# Patient Record
(demographics unavailable — no encounter records)

---

## 2024-12-12 NOTE — DEVELOPMENTAL MILESTONES
[Normal Development] : Normal Development [None] : none [Urinates in a potty or toilet] : does not urinate in a potty or toilet [Plays pretend with toys or dolls] : does not play pretend with toys or dolls [Pokes food with fork] : does not poke food with fork [Uses pronouns correctly] : does not use pronouns correctly [Explains the reason for things,] : does not explain the reason for things, such as needing a sweater when it's cold [Names at least one color] : does not name at least one color [Walks up steps, using one] : walks up steps, using one foot, then the other [Runs well without falling] : runs well without falling [Grasps crayon with thumb] : grasps crayon with thumb and fingers instead of fist [Catches a large ball] : does not catch a large ball [Copies a vertical line] : does not copy a vertical line [Not Passed] : not passed [FreeTextEntry1] : ASD diagnosis

## 2024-12-12 NOTE — PHYSICAL EXAM

## 2024-12-12 NOTE — DISCUSSION/SUMMARY
[Normal Growth] : growth [Normal Development] : development [None] : No known medical problems [No Elimination Concerns] : elimination [No Feeding Concerns] : feeding [No Skin Concerns] : skin [Normal Sleep Pattern] : sleep [Family Routines] : family routines [Language Promotion and Communication] : language promotion and communication [Social Development] : social development [ Considerations] :  considerations [Safety] : safety [No Medications] : ~He/She~ is not on any medications [Parent/Guardian] : parent/guardian [] : The components of the vaccine(s) to be administered today are listed in the plan of care. The disease(s) for which the vaccine(s) are intended to prevent and the risks have been discussed with the caretaker.  The risks are also included in the appropriate vaccination information statements which have been provided to the patient's caregiver.  The caregiver has given consent to vaccinate. [FreeTextEntry1] : Continue cow's milk. Continue table foods, 3 meals with 2-3 snacks per day. Incorporate flourinated water daily in a sippy cup. Brush teeth twice a day with soft toothbrush. Recommend visit to dentist. When in car, keep child in rear-facing car seats until age 2, or until  the maximum height and weight for seat is reached. Put toddler to sleep in own bed. Help toddler to maintain consistent daily routines and sleep schedule. Toilet training discussed. Ensure home is safe. Use consistent, positive discipline. Read aloud to toddler. Limit screen time to no more than 2 hours per day. ASD -continue all intervention  Flu vaccine today  needs dental visit -mom will make appt  RTO 3 y/o WC -will need Hep A #2

## 2024-12-12 NOTE — HISTORY OF PRESENT ILLNESS
[Mother] : mother [2% ___ oz/d] : consumes [unfilled] oz of 2%  milk per day [Fruit] : fruit [Vegetables] : vegetables [Meat] : meat [Grains] : grains [Eggs] : eggs [Dairy] : dairy [Normal] : Normal [Sippy cup use] : Sippy cup use [Yes] : Patient goes to dentist yearly [Vitamin] : Primary Fluoride Source: Vitamin [No] : No cigarette smoke exposure [Water heater temperature set at <120 degrees F] : Water heater temperature set at <120 degrees F [Car seat in back seat] : Car seat in back seat [Carbon Monoxide Detectors] : Carbon monoxide detectors [Smoke Detectors] : Smoke detectors [Exposure to electronic nicotine delivery system] : No exposure to electronic nicotine delivery system [Supervised play near cars and streets] : Supervised play near cars and streets [NO] : No [FreeTextEntry1] : 2.4 y/o here for WC  doing well  Hx developmental delays -ASD diagnosis after recent psychological evaluation  speech delays and behavior concerns -receives ST twice a week  was receiving Special Instruction / that was Dcd and now receives THUY 5 days week for 1 hour sessions  making very slow progress -as per mom  picky eating  + wet diapers and stooling well  walking well / runs/ climbs  says mama -not to mom  responds to name at times / eye contact has been better  hearing was a concern has ABR under sedation in April -normal hearing results  sleeps well

## 2025-04-18 NOTE — DISCUSSION/SUMMARY
[FreeTextEntry1] : ? impetigo will start with mupirocin but advised mom to call if spreading, may need oral abx as already 4 discrete lesions vaseline 2-3x a day fragrance free products RTO/call for new/worsening symptoms or as needed

## 2025-04-18 NOTE — PHYSICAL EXAM
[NL] : moves all extremities x4, warm, well perfused x4 [de-identified] : right cheek with 4 small plaques - one excoriated/open

## 2025-04-28 NOTE — DISCUSSION/SUMMARY
[FreeTextEntry1] : continue mupirocin, add topical steroid ointment vaseline 2-3x a day RTO/call for new/worsening symptoms or as needed

## 2025-04-28 NOTE — PHYSICAL EXAM
[NL] : moves all extremities x4, warm, well perfused x4 [de-identified] : 4 small erythematous patches on right side of face

## 2025-06-06 NOTE — ASSESSMENT
[Use of independent historian: [ enter independent historian's relationship to patient ] :____] : As the patient was unable to provide a complete and reliable history, I obtained clinical history from the patient's [unfilled] [FreeTextEntry1] : #Favor irritant dermatitis - mom did not have photos of how rash look prior - favor possible previous impetigo however now likely irrirtated  - use hydrocortisone ointment until smooth and then START tacrolimus 0.03% ointment BID. SED - bacterial and fungal cx taken today, f/u results  -Sun protection was discussed. The proper use of broad-spectrum UVA/UVB sunscreens with SPF 30 or greater was reviewed and the need for re-application after swimming or sweating or 2-3 hours was emphasized. We talked about judicious use of clothing and avoidance of peak periods of sun exposure. -  #xerosis  Dry skin care reviewed:  - Take short showers/baths (avoid hot water)  - Use a mild soap (eg. CeraVe cleanser or Aquaphor)  - Use soap only on areas truly needed (underarms,groin,buttocks,fold areas, feet, face, hair)  - Pat off excess water and put moisturizer on immediately (within 3 min.)  Good moisturizing choices include:  1. Cetaphil cream (not baby Cetaphil)  2. CeraVe cream  3. Vanicream cream  4. Aquaphor ointment  5. Vaseline ointment  6. CeraVe ointment  - A moisturizer should always be applied after showering or bathing, but may be applied as many additional times as is necessary.  RTC PRN

## 2025-06-06 NOTE — CONSULT LETTER
[FreeTextEntry3] : Kelley Maldonado MD  [facility] Pediatric Dermatology

## 2025-06-06 NOTE — HISTORY OF PRESENT ILLNESS
[FreeTextEntry1] : NPSHERIDAN rash  [de-identified] : Jt is a 3yo M hx of ASD here for evaluation of below  #spots on face - appeared 4 weeks ago - went to pediatrician who told him that it was "Bacterial infection", gave mupirocin to use TID - mom has used and spots have gotten better and worse, on and off - Last week, went back to pediatrician, recommended adding hydrocortisone 1% ointment, mom says she used that a BID for a few days and then went back to mupirocin without significant improvement  - No systemic sx  - S: Baby cetaphil - D: All free and clear

## 2025-06-06 NOTE — PHYSICAL EXAM
[Alert] : alert [Oriented x 3] : ~L oriented x 3 [Well Nourished] : well nourished [Conjunctiva Non-injected] : conjunctiva non-injected [No Visual Lymphadenopathy] : no visual  lymphadenopathy [No Clubbing] : no clubbing [No Edema] : no edema [No Bromhidrosis] : no bromhidrosis [No Chromhidrosis] : no chromhidrosis [FreeTextEntry3] : 3-4 dry scaly pink to light red plaques on cheeks  dryness

## 2025-06-06 NOTE — HISTORY OF PRESENT ILLNESS
[FreeTextEntry1] : NPSHERIDAN rash  [de-identified] : Jt is a 3yo M hx of ASD here for evaluation of below  #spots on face - appeared 4 weeks ago - went to pediatrician who told him that it was "Bacterial infection", gave mupirocin to use TID - mom has used and spots have gotten better and worse, on and off - Last week, went back to pediatrician, recommended adding hydrocortisone 1% ointment, mom says she used that a BID for a few days and then went back to mupirocin without significant improvement  - No systemic sx  - S: Baby cetaphil - D: All free and clear

## 2025-07-28 NOTE — DEVELOPMENTAL MILESTONES
[Normal Development] : Normal Development [None] : none [Goes to the bathroom and urinates] : does not go to bathroom and urinates by self [Plays and shares with others] : plays and shares with others [Put on coat, jacket, or shirt by self] : puts on coat, jacket, or shirt by self [Begins to play make-believe] : does not begin to play make-believe [Eats independently] : eats independently [Uses 3-word sentences] : does not use 3-word sentences [Uses words that are 75% intelligible] : does not use words that are 75% intelligible to strangers [Understands simple prepositions] : does not understand simple prepositions [Tells a story from a book or TV] : does not tell a story from a book or TV [Compares things using words such] : does not compare things using words such as bigger or shorter [Pedals tricycle] : does not pedal tricycle [Climbs on and off couch] : climbs on and off couch or chair [Jumps forward] : jumps forward [Draws a single [place]] : does not draw a single [place] [Draws a person with head] : does not draw a person with head and one other body part [Cuts with child scissor] : does not cut with child scissor [FreeTextEntry1] : ASD  SPeech Delays

## 2025-07-28 NOTE — DISCUSSION/SUMMARY
[Normal Growth] : growth [Normal Development] : development [None] : No known medical problems [No Elimination Concerns] : elimination [No Feeding Concerns] : feeding [No Skin Concerns] : skin [Normal Sleep Pattern] : sleep [Family Support] : family support [Encouraging Literacy Activities] : encouraging literacy activities [Playing with Peers] : playing with peers [Promoting Physical Activity] : promoting physical activity [Safety] : safety [No Medications] : ~He/She~ is not on any medications [Parent/Guardian] : parent/guardian [] : The components of the vaccine(s) to be administered today are listed in the plan of care. The disease(s) for which the vaccine(s) are intended to prevent and the risks have been discussed with the caretaker.  The risks are also included in the appropriate vaccination information statements which have been provided to the patient's caregiver.  The caregiver has given consent to vaccinate. [FreeTextEntry1] : Continue balanced diet with all food groups. Brush teeth twice a day with toothbrush. Recommend visit to dentist. As per car seat 's guidelines, use foward-facing car seat in back seat of car. Switch to booster seat when child reaches highest weight/height for seat. Child needs to ride in a belt-positioning booster seat until  4 feet 9 inches has been reached and are between 8 and 12 years of age. Put toddler to sleep in own bed. Help toddler to maintain consistent daily routines and sleep schedule. Pre-K discussed. Ensure home is safe. Use consistent, positive discipline. Read aloud to toddler. Limit screen time to no more than 2 hours per day. Return for well child check in 1 year. re-check weight 6 mo / sooner for concerns  refer to Nutrition for picky eating

## 2025-07-28 NOTE — HISTORY OF PRESENT ILLNESS
[Mother] : mother [2% ___ oz/d] : consumes [unfilled] oz of 2% cow's milk per day [Fruit] : fruit [Vegetables] : vegetables [Meat] : meat [Grains] : grains [Eggs] : eggs [Dairy] : dairy [Normal] : Normal [Sippy cup use] : Sippy cup use [Yes] : Patient goes to dentist yearly [Vitamin] : Primary Fluoride Source: Vitamin [Parent has appropriate responses to behavior] : Parent has appropriate responses to behavior [No] : No cigarette smoke exposure [Water heater temperature set at <120 degrees F] : Water heater temperature set at <120 degrees F [Car seat in back seat] : Car seat in back seat [Smoke Detectors] : Smoke detectors [Supervised play near cars and streets] : Supervised play near cars and streets [Carbon Monoxide Detectors] : Carbon monoxide detectors [Exposure to electronic nicotine delivery system] : No exposure to electronic nicotine delivery system [Up to date] : Up to date [FreeTextEntry9] : Hx ASD  [FreeTextEntry1] : 3 y/o here for WC  doing well  Hx ASD  speech delays and behavior concerns -receives ST twice a week  OT -twice week and THUY 5 days week  will start Variety Program in Sept -5 days week  making very slow progress -as per mom -5 words / not many people will understand  picky eating - a few things he will eat  + wet diapers and stooling well  walking well / runs/ climbs  responds to name at times / eye contact has been better but still not consistent  hearing was a concern had ABR under sedation in April -normal hearing results  sleeps well  drinks pediasure once daily

## 2025-07-28 NOTE — DEVELOPMENTAL MILESTONES
[Normal Development] : Normal Development [None] : none [Goes to the bathroom and urinates] : does not go to bathroom and urinates by self [Plays and shares with others] : plays and shares with others [Put on coat, jacket, or shirt by self] : puts on coat, jacket, or shirt by self [Begins to play make-believe] : does not begin to play make-believe [Eats independently] : eats independently [Uses 3-word sentences] : does not use 3-word sentences [Uses words that are 75% intelligible] : does not use words that are 75% intelligible to strangers [Understands simple prepositions] : does not understand simple prepositions [Tells a story from a book or TV] : does not tell a story from a book or TV [Compares things using words such] : does not compare things using words such as bigger or shorter [Pedals tricycle] : does not pedal tricycle [Climbs on and off couch] : climbs on and off couch or chair [Jumps forward] : jumps forward [Draws a single [place]] : does not draw a single [place] [Draws a person with head] : does not draw a person with head and one other body part [Cuts with child scissor] : does not cut with child scissor [FreeTextEntry1] : ASD  SPeech Delays